# Patient Record
Sex: MALE | Race: BLACK OR AFRICAN AMERICAN | NOT HISPANIC OR LATINO | Employment: PART TIME | URBAN - METROPOLITAN AREA
[De-identification: names, ages, dates, MRNs, and addresses within clinical notes are randomized per-mention and may not be internally consistent; named-entity substitution may affect disease eponyms.]

---

## 2021-05-30 ENCOUNTER — RECORDS - HEALTHEAST (OUTPATIENT)
Dept: ADMINISTRATIVE | Facility: CLINIC | Age: 31
End: 2021-05-30

## 2023-04-06 ENCOUNTER — APPOINTMENT (OUTPATIENT)
Dept: RADIOLOGY | Facility: HOSPITAL | Age: 33
End: 2023-04-06
Attending: STUDENT IN AN ORGANIZED HEALTH CARE EDUCATION/TRAINING PROGRAM
Payer: COMMERCIAL

## 2023-04-06 ENCOUNTER — HOSPITAL ENCOUNTER (EMERGENCY)
Facility: HOSPITAL | Age: 33
Discharge: HOME | End: 2023-04-06
Attending: EMERGENCY MEDICINE | Admitting: EMERGENCY MEDICINE
Payer: COMMERCIAL

## 2023-04-06 VITALS
RESPIRATION RATE: 18 BRPM | WEIGHT: 170 LBS | HEART RATE: 53 BPM | OXYGEN SATURATION: 100 % | SYSTOLIC BLOOD PRESSURE: 149 MMHG | TEMPERATURE: 98.4 F | HEIGHT: 69 IN | DIASTOLIC BLOOD PRESSURE: 81 MMHG | BODY MASS INDEX: 25.18 KG/M2

## 2023-04-06 DIAGNOSIS — S62.101A CLOSED FRACTURE OF RIGHT WRIST, INITIAL ENCOUNTER: ICD-10-CM

## 2023-04-06 PROCEDURE — 250N000013 HC RX MED GY IP 250 OP 250 PS 637: Performed by: EMERGENCY MEDICINE

## 2023-04-06 PROCEDURE — 73110 X-RAY EXAM OF WRIST: CPT | Mod: RT

## 2023-04-06 PROCEDURE — 99283 EMERGENCY DEPT VISIT LOW MDM: CPT

## 2023-04-06 PROCEDURE — 25622 CLTX CARPL SCPHD FX W/O MNPJ: CPT

## 2023-04-06 RX ORDER — ACETAMINOPHEN 325 MG/1
650 TABLET ORAL ONCE
Status: COMPLETED | OUTPATIENT
Start: 2023-04-06 | End: 2023-04-06

## 2023-04-06 RX ORDER — IBUPROFEN 600 MG/1
600 TABLET, FILM COATED ORAL ONCE
Status: COMPLETED | OUTPATIENT
Start: 2023-04-06 | End: 2023-04-06

## 2023-04-06 RX ADMIN — IBUPROFEN 600 MG: 600 TABLET, FILM COATED ORAL at 08:46

## 2023-04-06 RX ADMIN — ACETAMINOPHEN 650 MG: 325 TABLET ORAL at 08:46

## 2023-04-06 NOTE — DISCHARGE INSTRUCTIONS
You have a wrist fracture  The wrist has been temporarily splinted.  Wear the splint until follow-up with the hand specialist  Take Tylenol and Motrin for pain  Your wrist will likely need surgery or a cast  Call the orthopedic specialist today

## 2023-04-06 NOTE — ED TRIAGE NOTES
Patient presents here for evaluation of an injury to his right wrist that occurred yesterday while walking his dog. He fell and injured the wrist. It appears to be in normal anatomic position, but there is swelling at the wrist joint and he is noting limited ROM to his fingers. Palpable radial pulse and CMS to the hand is intact, including brisk cap refill.      Triage Assessment     Row Name 04/06/23 0758       Triage Assessment (Adult)    Airway WDL WDL       Respiratory WDL    Respiratory WDL WDL       Skin Circulation/Temperature WDL    Skin Circulation/Temperature WDL WDL       Cardiac WDL    Cardiac WDL WDL       Peripheral/Neurovascular WDL    Peripheral Neurovascular WDL WDL       Cognitive/Neuro/Behavioral WDL    Cognitive/Neuro/Behavioral WDL WDL

## 2023-04-06 NOTE — ED PROVIDER NOTES
EMERGENCY DEPARTMENT ENCOUNTER            IMPRESSION:  Wrist - scaphoid fracture  Right knee contusion      MEDICAL DECISION MAKING:  It was my pleasure to provide care for Blake Nieto Jr. who presented for evaluation of right wrist injury and right knee injury after a fall    Patient is pleasant and cooperative    On exam vital signs show some slightly elevated blood pressure otherwise normal.  The wrist has some soft tissue swelling over the dorsum.  There is tenderness over the scaphoid and pain with range of motion.  Right knee also has an effusion.  He is able to bear full weight on the lower extremity.  Extension against resistance is intact.  No patellar tenderness    Symptomatic pain medications were given    Imaging independently reviewed and agree with radiologist findings of right wrist scaphoid fracture    The wrist was splinted    Patient discharged home with orthopedic follow-up          =================================================================  CHIEF COMPLAINT:  Chief Complaint   Patient presents with     Trauma         HPI  Blake Nieto Jr. 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    Patient reports that he fell yesterday evening (4/5/2023) while walking his dog. He fell onto his right knee and right wrist. Since then, he endorses right wrist pain and swelling. He also associates an effusion on his right knee but doesn't endorse pain from the knee.     There were no other concerns/complaints at this time.      REVIEW OF SYSTEMS   Musculoskeletal: Endorses right wrist pain and swelling, right knee effusion. Does not report any new musculoskeletal pain or new muscle pains      Remainder of systems reviewed, unless noted in HPI all others negative.      PAST MEDICAL HISTORY:  History reviewed. No pertinent past medical history.    PAST SURGICAL HISTORY:  History reviewed. No  "pertinent surgical history.      CURRENT MEDICATIONS:    No current outpatient medications on file.      ALLERGIES:  No Known Allergies    FAMILY HISTORY:  History reviewed. No pertinent family history.    SOCIAL HISTORY:   Social History     Socioeconomic History     Marital status: Single       PHYSICAL EXAM:    BP (!) 147/82   Pulse 55   Temp 98.4  F (36.9  C) (Oral)   Resp 16   Ht 1.753 m (5' 9\")   Wt 77.1 kg (170 lb)   SpO2 100%   BMI 25.10 kg/m      Constitutional: Awake, alert, in no acute distress  Head: Normocephalic, atraumatic.     Musculoskeletal: Right wrist normal to inspection.  There is some soft tissue swelling over the dorsum.  Tenderness to palpation of the scaphoid.  Hand otherwise has good strength and sensation.  Good  strength.  There is also an effusion of the right prepatellar.  There is no patellar tenderness.  Knee has full range of motion and strength against resistance.  No clinical evidence of patellar or tendon injury  Lymphatic: No cervical lymphadenopathy  Neurologic: Alert & oriented x 3. Normal speech.     ED COURSE:    8:39 AM I met with the patient to obtain patient history and performed a physical exam. Discussed plan for ED work up including potential diagnostic studies and interventions.  8:58 AM Reevaluated and updated the patient. We discussed the plan for discharge and the patient is agreeable. Reviewed supportive cares, symptomatic treatment, outpatient follow up, and reasons to return to the Emergency Department. Patient to be discharged by ED RN.         Medical Decision Making    History:    Supplemental history from: N/A    External Record(s) reviewed: External medical records including care everywhere reviewed    Work Up:     imaging studies independently reviewed by the provider; considered orthopedic consultation however I am confident he will be able to follow-up in the next 48 hours    Complicating factors:    Care affected by social determinants of " health: Access to primary care    Disposition considerations: Disposition involved in shared decision-making with the patient, patient instructed to continue outpatient medication as prescribed, prescription strength Tylenol Motrin recommended             LAB:  Laboratory results were independently reviewed and interpreted  Results for orders placed or performed during the hospital encounter of 04/06/23   XR Wrist Right G/E 3 Views    Impression    IMPRESSION: Acute scaphoid waist fracture with minimal displacement and impaction.    NOTE: ABNORMAL REPORT    THE DICTATION ABOVE DESCRIBES AN ABNORMALITY FOR WHICH FOLLOW-UP IS NEEDED.          RADIOLOGY:  Radiology reports were independently reviewed and interpreted  XR Wrist Right G/E 3 Views   Final Result   IMPRESSION: Acute scaphoid waist fracture with minimal displacement and impaction.      NOTE: ABNORMAL REPORT      THE DICTATION ABOVE DESCRIBES AN ABNORMALITY FOR WHICH FOLLOW-UP IS NEEDED.           PROCEDURES:     PROCEDURE: Splint Placement   INDICATIONS: right Wrist fracture   PROCEDURE PROVIDER: Dr Amadou Bates   NOTE:  A Thumb spica splint made of Orthoglass was applied to the Right upper extremity by the above provider. As noted in the physical exam, distal CMS was intact prior to placement. The splint was checked and the fit was adjusted to ensure proper positioning after placement. Sensation and circulation, as well as motor function, are unchanged after splint placement and the patient is more comfortable with the splint in place.       MEDICATIONS GIVEN IN THE EMERGENCY:  Medications   acetaminophen (TYLENOL) tablet 650 mg (650 mg Oral $Given 4/6/23 0846)   ibuprofen (ADVIL/MOTRIN) tablet 600 mg (600 mg Oral $Given 4/6/23 0846)           NEW PRESCRIPTIONS STARTED AT TODAY'S ER VISIT:  New Prescriptions    No medications on file          Prior to making a final disposition on this patient the results of patient's tests and other diagnostic studies were  discussed with the patient. All questions were answered. Patient expressed understanding of the plan and was amenable to it.      FINAL DIAGNOSIS:    ICD-10-CM    1. Closed fracture of right wrist, initial encounter  S62.101A                  NAME: Blake Nieto Jr.  AGE: 32 year old male  YOB: 1990  MRN: [id number]  EVALUATION DATE & TIME: No admission date for patient encounter.    PCP: No primary care provider on file.    ED PROVIDER: Amadou Bates M.D.      Annette ZARATE, am serving as a scribe to document services personally performed by Dr. Amadou Bates based on my observation and the provider's statements to me. I, Amadou Bates MD attest that Annette Espitia is acting in a scribe capacity, has observed my performance of the services and has documented them in accordance with my direction.    Amadou Bates M.D.  Emergency Medicine  [facility] EMERGENCY DEPARTMENT  [facility] [facility] [address]  [phone]  Dept: [phone]  4/6/2023       Amadou Bates MD  04/06/23 0904

## 2023-04-07 ENCOUNTER — TRANSFERRED RECORDS (OUTPATIENT)
Dept: HEALTH INFORMATION MANAGEMENT | Facility: CLINIC | Age: 33
End: 2023-04-07
Payer: COMMERCIAL

## 2023-05-20 ENCOUNTER — HEALTH MAINTENANCE LETTER (OUTPATIENT)
Age: 33
End: 2023-05-20

## 2024-06-17 ENCOUNTER — APPOINTMENT (OUTPATIENT)
Dept: RADIOLOGY | Facility: HOSPITAL | Age: 34
End: 2024-06-17
Attending: STUDENT IN AN ORGANIZED HEALTH CARE EDUCATION/TRAINING PROGRAM
Payer: COMMERCIAL

## 2024-06-17 ENCOUNTER — HOSPITAL ENCOUNTER (EMERGENCY)
Facility: HOSPITAL | Age: 34
Discharge: HOME | End: 2024-06-17
Payer: COMMERCIAL

## 2024-06-17 VITALS
HEIGHT: 70 IN | BODY MASS INDEX: 24.34 KG/M2 | DIASTOLIC BLOOD PRESSURE: 84 MMHG | WEIGHT: 170 LBS | HEART RATE: 61 BPM | TEMPERATURE: 97 F | SYSTOLIC BLOOD PRESSURE: 139 MMHG | RESPIRATION RATE: 15 BRPM | OXYGEN SATURATION: 98 %

## 2024-06-17 DIAGNOSIS — L03.031 PARONYCHIA OF GREAT TOE OF RIGHT FOOT: ICD-10-CM

## 2024-06-17 PROCEDURE — 73660 X-RAY EXAM OF TOE(S): CPT | Mod: RT

## 2024-06-17 PROCEDURE — 99283 EMERGENCY DEPT VISIT LOW MDM: CPT

## 2024-06-17 PROCEDURE — 10060 I&D ABSCESS SIMPLE/SINGLE: CPT

## 2024-06-17 RX ORDER — SULFAMETHOXAZOLE/TRIMETHOPRIM 800-160 MG
1 TABLET ORAL 2 TIMES DAILY
Qty: 14 TABLET | Refills: 0 | Status: SHIPPED | OUTPATIENT
Start: 2024-06-17 | End: 2024-06-24

## 2024-06-17 ASSESSMENT — ENCOUNTER SYMPTOMS
NUMBNESS: 0
WOUND: 0
WEAKNESS: 0
CHILLS: 0
FEVER: 0

## 2024-06-17 ASSESSMENT — ACTIVITIES OF DAILY LIVING (ADL)
ADLS_ACUITY_SCORE: 35
ADLS_ACUITY_SCORE: 35

## 2024-06-17 ASSESSMENT — COLUMBIA-SUICIDE SEVERITY RATING SCALE - C-SSRS
6. HAVE YOU EVER DONE ANYTHING, STARTED TO DO ANYTHING, OR PREPARED TO DO ANYTHING TO END YOUR LIFE?: NO
2. HAVE YOU ACTUALLY HAD ANY THOUGHTS OF KILLING YOURSELF IN THE PAST MONTH?: NO
1. IN THE PAST MONTH, HAVE YOU WISHED YOU WERE DEAD OR WISHED YOU COULD GO TO SLEEP AND NOT WAKE UP?: NO

## 2024-06-17 NOTE — DISCHARGE INSTRUCTIONS
You were seen in the ER for right toe pain and found to have an infection, please take your antibiotic as discussed. Your XR showed no fracture.    Start warm water soapy soaks twice daily. Keep your foot clean and dry - always wear clean dry socks. You may use ibuprofen for swelling/pain and Tylenol as needed for pain.    Ibuprofen/Naproxen Discharge Instructions:  You may take ibuprofen for pain control.  The maximum dose of (ibuprofen is 3200 mg ) in a 24-hour period.    Take this medication with food to prevent stomach irritation.  With long-term use this medication can irritate the stomach causing pain and lead to development of a stomach ulcer.  If you notice stomach pain or vomiting of coffee-ground colored vomit or blood, please be seen by a healthcare provider.  Attempt to use this medication for the shortest time possible.      Tylenol (Acetaminophen) Discharge Instructions:  You may take 2 tablets of regular strength, over-the-counter, Tylenol (acetaminophen) every 4-6 hours as needed for pain.  Take no more than 4000 mg of Tylenol in a 24-hour period.      Avoid taking more than 1 acetaminophen-containing product at a time and be aware that many over-the-counter medications contain a combination of acetaminophen and other products.  If you are taking Tylenol in addition to a combination product please keep track of your daily acetaminophen dose to make sure you do not exceed the recommended 4000 mg.  Taking too much acetaminophen can cause permanent damage to your liver.    Follow-up with your primary care provider for reevaluation and ongoing management - referral placed today.    Return to the emergency department for any new or worsening symptoms including increased pain, redness/warmth/drainage/swelling, fever/chills, new weakness, numbness/tingling, decreased range of motion, cold extremity, or any other concerning symptoms.     Take Care!  - Viktoriya Bates PA-C

## 2024-06-17 NOTE — ED TRIAGE NOTES
Patient reports stubbed right big toe about 3 weeks ago.  Has been having pain non stop since.  Worse with manipulation or wearing a shoe.  Not taking anything for pain.

## 2024-06-17 NOTE — ED PROVIDER NOTES
EMERGENCY DEPARTMENT ENCOUNTER      NAME: Blake Nieto Jr.  AGE: 33 year old male  YOB: 1990  MRN: [id number]  EVALUATION DATE & TIME: 2024  3:31 PM    PCP: No Ref-Primary, Physician    ED PROVIDER: Viktoriya Bates PA-C      Chief Complaint   Patient presents with    Toe Pain         FINAL IMPRESSION:  1. Paronychia of great toe of right foot          ED COURSE & MEDICAL DECISION MAKIN:02 PM Met with patient and his mother for initial interview. Plan for care discussed.  4:25 PM Reevaluated and updated patient. Digital block performed.  4:50 PM Reevaluated and updated patient. I&D performed. I discussed the plan for discharge with the patient, and patient is agreeable. We discussed supportive cares at home and reasons for return to the ER including new or worsening symptoms. All questions and concerns addressed. Patient to be discharged by RN.    33 year old male presents to the Emergency Department for evaluation of right great toe pain that developed yesterday. Of note, patient stubbed his toe in the gym 3 weeks ago. Upon exam, patient is afebrile, hemodynamically stable, and in no acute distress. Right great toe with mild induration and focal purulence as pictured below. Mild medial IP joint tenderness to palpation, otherwise no bony tenderness to palpation. Full ROM without pain. Neurovascularly intact. 5/5 strength. Differential diagnosis includes but not limited to fracture, dislocation, paronychia. No evidence of felon. Based on patient's presenting symptoms, imaging was ordered in triage. XR reveals no acute findings.    Patient declined analgesia upon arrival. Symptoms and workup most consistent with paronychia. Patient was made aware of the above findings. Discussed options with patient including I&D, which patient elects to proceed. I&D of paronychia performed as documented below with successful drainage. Plan to discharge patient home with prescription Bactrim, strict return  precautions, and close follow up with their PCP for reevaluation and ongoing management - referral placed today. The patient was stable and well appearing upon discharge. The patient was advised to return to the ER if any new or worsening symptoms develop. The patient verbalizes understanding and agrees with the plan.     Medical Decision Making  Obtained supplemental history:Supplemental history obtained?: Family Member/Significant Other  Reviewed external records: External records reviewed?: No  Care impacted by chronic illness:N/A  Care significantly affected by social determinants of health:Access to Medical Care  Did you consider but not order tests?: Work up considered but not performed and documented in chart, if applicable  Did you interpret images independently?: Independent interpretation of ECG and images noted in documentation, when applicable.  Consultation discussion with other provider:Did you involve another provider (consultant, , pharmacy, etc.)?: No  Discharge. I prescribed additional prescription strength medication(s) as charted. See documentation for any additional details.    MEDICATIONS GIVEN IN THE EMERGENCY:  Medications - No data to display    NEW PRESCRIPTIONS STARTED AT TODAY'S ER VISIT  New Prescriptions    SULFAMETHOXAZOLE-TRIMETHOPRIM (BACTRIM DS) 800-160 MG TABLET    Take 1 tablet by mouth 2 times daily for 7 days          =================================================================    HPI    Patient information was obtained from: Patient and his mother    Use of : N/A         Blake Nieto JrMichelle is a 33 year old male with a pertinent history of cellulitis who presents to this ED by walk in for evaluation of toe pain.    The patient reports 3 weeks of right great toe pain and bruising since injury against exercise equipment. He states that within the last day, pain has worsened with new swelling. At time of initial injury, he denies any break in the skin or  "deformity.    No drainage from his toe. No fever, chills, numbness, weakness, or other sensation changes. He denies a history of toe infection. The patient has not taken any OTC medications for his symptoms.      REVIEW OF SYSTEMS   Review of Systems   Constitutional:  Negative for chills and fever.   Skin:  Negative for wound.        Positive for right great toe pain, swelling, ecchymosis.   Neurological:  Negative for weakness and numbness.   All other systems reviewed and are negative.       PAST MEDICAL HISTORY:  No past medical history on file.    PAST SURGICAL HISTORY:  No past surgical history on file.        CURRENT MEDICATIONS:    sulfamethoxazole-trimethoprim (BACTRIM DS) 800-160 MG tablet        ALLERGIES:  No Known Allergies    FAMILY HISTORY:  No family history on file.    SOCIAL HISTORY:   Social History     Socioeconomic History    Marital status: Single     Social Determinants of Health      Received from [facility]    Social Connections       VITALS:  /84   Pulse 61   Temp 97  F (36.1  C) (Temporal)   Resp 15   Ht 1.778 m (5' 10\")   Wt 77.1 kg (170 lb)   SpO2 98%   BMI 24.39 kg/m      PHYSICAL EXAM    Constitutional:  Alert, in no acute distress. Cooperative.  EYES: Conjunctivae clear.  HENT:  Atraumatic, normocephalic.  Respiratory:  Respirations even, unlabored, in no acute respiratory distress.  Cardiovascular:  Regular rate, good peripheral perfusion.  GI: Soft, flat, non-distended.  Musculoskeletal: Right lower extremity: Right great toe with mild induration and focal purulence medial aspect of toenail as pictured below. No involvement of pulp of toe. No crepitus. Mild medial distal phalanx tenderness to palpation, otherwise no bony tenderness to palpation. Full ROM without pain. Neurovascularly intact. Cap refill <2 seconds. 2+ DP pulse. 5/5 strength. Compartments soft.  Integument: Warm, Dry.   Neurologic:  Alert & oriented. No focal deficits " noted.  Psych: Normal mood and affect.        LAB:  All pertinent labs reviewed and interpreted.  Results for orders placed or performed during the hospital encounter of 06/17/24   XR Toe Right G/E 2 Views    Impression    IMPRESSION: 3 views of the great toe show no evidence of an acute fracture. Joint spaces are maintained.       RADIOLOGY:  Reviewed all pertinent imaging. Please see official radiology report.  XR Toe Right G/E 2 Views   Final Result   IMPRESSION: 3 views of the great toe show no evidence of an acute fracture. Joint spaces are maintained.          PROCEDURES:   PROCEDURE: Incision and Drainage   INDICATIONS: Localized abscess   PROCEDURE PROVIDER: Viktoriya Bates PA-C   SITE: Right great toe   MEDICATION:  1.5 mLs of 1% Lidocaine without epinephrine   NOTE: The area was prepped with betadine and draped off in the usual sterile fashion.  Local anesthetic was injected subcutaneously with anesthesia effects demonstrated prior to proceeding.  The area of maximal fluctuance was opened with a # 11 Blade ([place]) using a Single Straight incision to allow for drainage.  The abscess was drained.  No Packing was placed into the abscess cavity.  A sterile dressing was placed over the area.   COMPLEXITY: Simple    Simple = single, furuncle, paronychia, superficial  Complex = multiple or abscess requiring probing, loculations, packing placement   COMPLICATIONS: Patient tolerated procedure well, without complication       I, Lee Henderson, am serving as a scribe to document services personally performed by Viktoriya Bates PA-C based on my observation and the provider's statements to me. I, Viktoriya Bates PA-C, attest that Lee Henderson is acting in a scribe capacity, has observed my performance of the services and has documented them in accordance with my direction.    Viktoriya Bates PA-C  [facility] EMERGENCY DEPARTMENT  [facility] [facility] [address] [phone]      Jana  RENA Sadler  06/17/24 5238

## 2024-06-17 NOTE — ED NOTES
"Patient reports that 3 weeks ago, he \"stubbed his toe\" there  was not break in the skin or deformities  and after the initially pain, the toes continued to ache and this week it felt worst with some swelling. Patient reports that he think it is infected as he thinks there is \"pus\" under the skin. RN assessed the area to be bruised. Patient reports that did not do any therapeutic interventions such as ice, OTC medications, elevation, Band-Aid or soaking his foot.   RN asked if he would like any of the above interventions and patient replied, \" no\"  "

## 2024-07-27 ENCOUNTER — HEALTH MAINTENANCE LETTER (OUTPATIENT)
Age: 34
End: 2024-07-27

## 2025-08-10 ENCOUNTER — HEALTH MAINTENANCE LETTER (OUTPATIENT)
Age: 35
End: 2025-08-10